# Patient Record
Sex: MALE | Race: WHITE | NOT HISPANIC OR LATINO | Employment: FULL TIME | URBAN - METROPOLITAN AREA
[De-identification: names, ages, dates, MRNs, and addresses within clinical notes are randomized per-mention and may not be internally consistent; named-entity substitution may affect disease eponyms.]

---

## 2021-04-27 ENCOUNTER — OFFICE VISIT (OUTPATIENT)
Dept: FAMILY MEDICINE CLINIC | Facility: CLINIC | Age: 41
End: 2021-04-27

## 2021-04-27 VITALS
HEART RATE: 78 BPM | WEIGHT: 256 LBS | HEIGHT: 72 IN | SYSTOLIC BLOOD PRESSURE: 112 MMHG | DIASTOLIC BLOOD PRESSURE: 78 MMHG | BODY MASS INDEX: 34.67 KG/M2 | TEMPERATURE: 98 F | OXYGEN SATURATION: 94 %

## 2021-04-27 DIAGNOSIS — R22.0 FACIAL MASS: ICD-10-CM

## 2021-04-27 DIAGNOSIS — G44.229 CHRONIC TENSION-TYPE HEADACHE, NOT INTRACTABLE: ICD-10-CM

## 2021-04-27 DIAGNOSIS — Z00.00 HEALTH MAINTENANCE EXAMINATION: ICD-10-CM

## 2021-04-27 DIAGNOSIS — G44.86 CERVICOGENIC HEADACHE: Primary | ICD-10-CM

## 2021-04-27 PROCEDURE — 99204 OFFICE O/P NEW MOD 45 MIN: CPT | Performed by: NURSE PRACTITIONER

## 2021-04-27 RX ORDER — NAPROXEN 500 MG/1
500 TABLET ORAL 2 TIMES DAILY WITH MEALS
Qty: 60 TABLET | Refills: 1 | Status: SHIPPED | OUTPATIENT
Start: 2021-04-27

## 2021-04-27 RX ORDER — IBUPROFEN 200 MG
200 TABLET ORAL EVERY 6 HOURS PRN
COMMUNITY

## 2021-04-27 NOTE — PROGRESS NOTES
"Chief Complaint  Establish Care (headache, cys on forehead) and Annual Exam    Subjective          Rony Mack presents to [facility]  Pleasant gentleman here today needs a new PCP is not been to the doctor for a while.  A couple longstanding issues he would like to discuss first is his forehead he has a small lump, likely cyst on his forehead he said the several years, is not really bothering him much but his parents keep asking him about it, thus he is here to discuss and he would like to go ahead and get it removed.  Is having no pain in this area, is not getting bigger, no redness or lesions.    He also has separate issue of headaches, tend to be in the evening at the end of his workday, more right-sided they tend to originate in his right occipital region around his C-spine but now actually having a C-spine tenderness or pain, his pain actually originates in his occipital region and extends upward to his right parietal, occasionally behind his right eye.  Its not throbbing not a vascular complaint no pulsating headache no fevers chills nausea vomiting paresthesias no associated vision change or gait difficulties.  He has no history of seizure or chronic migraines.  His headaches been going on for quite a few years really had not changed and characterization.  Not a big issue.  He is not had a previous work-up or MRI.    Family history  Possibly sister with migraines when she was young  No aneurysm history  No colon cancer or prostate cancer no early heart disease    Quit smoking 2 to 3 years ago                    Objective   Vital Signs:   /78   Pulse 78   Temp 98 °F (36.7 °C) (Temporal)   Ht 182.9 cm (72\")   Wt 116 kg (256 lb)   SpO2 94%   BMI 34.72 kg/m²     Physical Exam  Vitals reviewed.   Constitutional:       Appearance: Normal appearance. He is well-developed. He is not ill-appearing.   HENT:      Head: Normocephalic.      Comments: Right upper forehead at the " scalp line approximately 1.5 cm x .5   slightly elevated mobile mass consistent with a cyst less likely lipoma     Right Ear: Tympanic membrane normal.      Left Ear: Tympanic membrane normal.      Nose: Nose normal.      Mouth/Throat:      Mouth: Mucous membranes are dry.   Eyes:      General: No scleral icterus.     Conjunctiva/sclera: Conjunctivae normal.      Pupils: Pupils are equal, round, and reactive to light.   Neck:      Thyroid: No thyromegaly.      Vascular: No JVD.   Cardiovascular:      Rate and Rhythm: Normal rate and regular rhythm.      Heart sounds: Normal heart sounds. No murmur heard.   No friction rub. No gallop.    Pulmonary:      Effort: Pulmonary effort is normal. No respiratory distress.      Breath sounds: Normal breath sounds. No stridor. No wheezing or rales.   Abdominal:      General: Bowel sounds are normal. There is no distension.      Palpations: Abdomen is soft.      Tenderness: There is no abdominal tenderness.      Comments: No hepatosplenomegaly, no ascites,   Musculoskeletal:         General: No tenderness.      Cervical back: Neck supple.   Lymphadenopathy:      Cervical: No cervical adenopathy.   Skin:     General: Skin is warm and dry.      Findings: No erythema or rash.   Neurological:      General: No focal deficit present.      Mental Status: He is alert.      Cranial Nerves: No cranial nerve deficit.      Sensory: No sensory deficit.      Motor: No weakness.      Coordination: Coordination normal.      Gait: Gait normal.      Deep Tendon Reflexes: Reflexes are normal and symmetric. Reflexes normal.   Psychiatric:         Behavior: Behavior normal.         Thought Content: Thought content normal.         Judgment: Judgment normal.        Result Review :                 Assessment and Plan    There are no diagnoses linked to this encounter.    Follow Up   No follow-ups on file.  Patient was given instructions and counseling regarding his condition or for health maintenance  advice. Please see specific information pulled into the AVS if appropriate.     Patient has a facial mass upper forehead mass likely a cyst refer surgery to evaluate and for cyst removal.  Unrelated headaches, suspicious for tension  And possible cervicalgia induced headache  Spends quite a bit of time working at the [employer] I think much of his tension headaches are related to stress and ergonomics and chronic tension    We will have a plan for tension and neck related headaches  MRI of the brain and go ahead and get his MRI he is not sure if he will proceed with this at this time  He had no change in his headaches have been pretty consistent for couple years he is finally coming in to discuss these  Other option would be to see if things do not improve over the next 2 to 3 months of treatment and if not get the MRI certainly if he has any uncontrolled headaches new headache or worsening symptoms he should see neurology and MRI of the brain    Otherwise healthy diet regular exercise gradual weight reduction improve diet yearly physical    Office visit approximately 40 minutes  Detailed risk-benefit including bleed kidney failure heart attack stroke elevated blood pressure strategy to mitigate risk lowest effective dose shortest time possible

## 2021-04-27 NOTE — PATIENT INSTRUCTIONS
Discharge instructions    Daily stretching of the neck,  And massage of the muscles, posterior scalp occipital region and neck.    Abortive therapy early for any headache, with relaxation meditation massage, naproxen if needed use sparingly    Okay to give this up to 2 to 3 months for improvement if these are mild and improving  To wait on the MRI but certainly we should get MRI of your headaches are not improving    Any severe worsening headaches urgent recheck urgent MRI  Severe persisting worst headache ever weakness confusion slurred speech paresthesias vision difficulties gait difficulties emergency room.    Healthy diet regular exercise  1800 vahe a day mostly vegetables chicken fish maximum 2000 vahe  More vegetables less much less processed food fast food  Less breads and pasta which drive appetite and promote fatty liver.  64 ounces of water daily      Good ergonomics at your desk, consider changing the monitor height etc. get up hourly  Consider a standing desk,      Return to office yearly physical recheck in 3 months    Tension Headache, Adult  A tension headache is pain, pressure, or aching in your head. Tension headaches can last from 30 minutes to several days.  Follow these instructions at home:  Managing pain  · Take over-the-counter and prescription medicines only as told by your doctor.  · When you have a headache, lie down in a dark, quiet room.  · If told, put ice on your head and neck:  ? Put ice in a plastic bag.  ? Place a towel between your skin and the bag.  ? Leave the ice on for 20 minutes, 2-3 times a day.  · If told, put heat on the back of your neck. Do this as often as your doctor tells you to. Use the kind of heat that your doctor recommends, such as a moist heat pack or a heating pad.  ? Place a towel between your skin and the heat.  ? Leave the heat on for 20-30 minutes.  ? Remove the heat if your skin turns bright red.  Eating and drinking  · Eat meals on a regular  schedule.  · Watch how much alcohol you drink:  ? If you are a woman and are not pregnant, do not drink more than 1 drink a day.  ? If you are a man, do not drink more than 2 drinks a day.  · Drink enough fluid to keep your pee (urine) pale yellow.  · Do not use a lot of caffeine, or stop using caffeine.  Lifestyle  · Get enough sleep. Get 7-9 hours of sleep each night. Or get the amount of sleep that your doctor tells you to.  · At bedtime, remove all electronic devices from your room. Examples of electronic devices are computers, phones, and tablets.  · Find ways to lessen your stress. Some things that can lessen stress are:  ? Exercise.  ? Deep breathing.  ? Yoga.  ? Music.  ? Positive thoughts.  · Sit up straight. Do not tighten (tense) your muscles.  · Do not use any products that have nicotine or tobacco in them, such as cigarettes and e-cigarettes. If you need help quitting, ask your doctor.  General instructions    · Keep all follow-up visits as told by your doctor. This is important.  · Avoid things that can bring on headaches. Keep a journal to find out if certain things bring on headaches. For example, write down:  ? What you eat and drink.  ? How much sleep you get.  ? Any change to your diet or medicines.  Contact a doctor if:  · Your headache does not get better.  · Your headache comes back.  · You have a headache and sounds, light, or smells bother you.  · You feel sick to your stomach (nauseous) or you throw up (vomit).  · Your stomach hurts.  Get help right away if:  · You suddenly get a very bad headache along with any of these:  ? A stiff neck.  ? Feeling sick to your stomach.  ? Throwing up.  ? Feeling weak.  ? Trouble seeing.  ? Feeling short of breath.  ? A rash.  ? Feeling unusually sleepy.  ? Trouble speaking.  ? Pain in your eye or ear.  ? Trouble walking or balancing.  ? Feeling like you will pass out (faint).  ? Passing out.  Summary  · A tension headache is pain, pressure, or aching in  your head.  · Tension headaches can last from 30 minutes to several days.  · Lifestyle changes and medicines may help relieve pain.  This information is not intended to replace advice given to you by your health care provider. Make sure you discuss any questions you have with your health care provider.  Document Revised: 10/14/2020 Document Reviewed: 03/30/2018  Elsevier Patient Education © 2021 [employer]

## 2021-04-28 LAB
ALBUMIN SERPL-MCNC: 4.9 G/DL (ref 3.5–5.2)
ALBUMIN/GLOB SERPL: 2.3 G/DL
ALP SERPL-CCNC: 71 U/L (ref 39–117)
ALT SERPL-CCNC: 31 U/L (ref 1–41)
APPEARANCE UR: CLEAR
AST SERPL-CCNC: 23 U/L (ref 1–40)
BASOPHILS # BLD AUTO: 0.05 10*3/MM3 (ref 0–0.2)
BASOPHILS NFR BLD AUTO: 0.8 % (ref 0–1.5)
BILIRUB SERPL-MCNC: 1 MG/DL (ref 0–1.2)
BILIRUB UR QL STRIP: NEGATIVE
BUN SERPL-MCNC: 21 MG/DL (ref 6–20)
BUN/CREAT SERPL: 18.3 (ref 7–25)
CALCIUM SERPL-MCNC: 9.8 MG/DL (ref 8.6–10.5)
CHLORIDE SERPL-SCNC: 106 MMOL/L (ref 98–107)
CHOLEST SERPL-MCNC: 186 MG/DL (ref 0–200)
CO2 SERPL-SCNC: 25.7 MMOL/L (ref 22–29)
COLOR UR: YELLOW
CREAT SERPL-MCNC: 1.15 MG/DL (ref 0.76–1.27)
EOSINOPHIL # BLD AUTO: 0.21 10*3/MM3 (ref 0–0.4)
EOSINOPHIL NFR BLD AUTO: 3.6 % (ref 0.3–6.2)
ERYTHROCYTE [DISTWIDTH] IN BLOOD BY AUTOMATED COUNT: 12.7 % (ref 12.3–15.4)
ERYTHROCYTE [SEDIMENTATION RATE] IN BLOOD BY WESTERGREN METHOD: 2 MM/HR (ref 0–15)
GLOBULIN SER CALC-MCNC: 2.1 GM/DL
GLUCOSE SERPL-MCNC: 81 MG/DL (ref 65–99)
GLUCOSE UR QL: NEGATIVE
HCT VFR BLD AUTO: 47.9 % (ref 37.5–51)
HDLC SERPL-MCNC: 30 MG/DL (ref 40–60)
HGB BLD-MCNC: 16.8 G/DL (ref 13–17.7)
HGB UR QL STRIP: NEGATIVE
IMM GRANULOCYTES # BLD AUTO: 0.02 10*3/MM3 (ref 0–0.05)
IMM GRANULOCYTES NFR BLD AUTO: 0.3 % (ref 0–0.5)
KETONES UR QL STRIP: NEGATIVE
LDLC SERPL CALC-MCNC: 102 MG/DL (ref 0–100)
LDLC/HDLC SERPL: 3.09 {RATIO}
LEUKOCYTE ESTERASE UR QL STRIP: NEGATIVE
LYMPHOCYTES # BLD AUTO: 1.97 10*3/MM3 (ref 0.7–3.1)
LYMPHOCYTES NFR BLD AUTO: 33.4 % (ref 19.6–45.3)
MCH RBC QN AUTO: 30.8 PG (ref 26.6–33)
MCHC RBC AUTO-ENTMCNC: 35.1 G/DL (ref 31.5–35.7)
MCV RBC AUTO: 87.9 FL (ref 79–97)
MONOCYTES # BLD AUTO: 0.59 10*3/MM3 (ref 0.1–0.9)
MONOCYTES NFR BLD AUTO: 10 % (ref 5–12)
NEUTROPHILS # BLD AUTO: 3.06 10*3/MM3 (ref 1.7–7)
NEUTROPHILS NFR BLD AUTO: 51.9 % (ref 42.7–76)
NITRITE UR QL STRIP: NEGATIVE
NRBC BLD AUTO-RTO: 0 /100 WBC (ref 0–0.2)
PH UR STRIP: 5.5 [PH] (ref 5–8)
PLATELET # BLD AUTO: 230 10*3/MM3 (ref 140–450)
POTASSIUM SERPL-SCNC: 4.4 MMOL/L (ref 3.5–5.2)
PROT SERPL-MCNC: 7 G/DL (ref 6–8.5)
PROT UR QL STRIP: NEGATIVE
RBC # BLD AUTO: 5.45 10*6/MM3 (ref 4.14–5.8)
SODIUM SERPL-SCNC: 144 MMOL/L (ref 136–145)
SP GR UR: 1.02 (ref 1–1.03)
T4 FREE SERPL-MCNC: 1.06 NG/DL (ref 0.93–1.7)
TRIGL SERPL-MCNC: 316 MG/DL (ref 0–150)
TSH SERPL DL<=0.005 MIU/L-ACNC: 6.68 UIU/ML (ref 0.27–4.2)
UROBILINOGEN UR STRIP-MCNC: NORMAL MG/DL
VLDLC SERPL CALC-MCNC: 54 MG/DL (ref 5–40)
WBC # BLD AUTO: 5.9 10*3/MM3 (ref 3.4–10.8)

## 2021-06-09 ENCOUNTER — TELEMEDICINE (OUTPATIENT)
Dept: FAMILY MEDICINE CLINIC | Facility: CLINIC | Age: 41
End: 2021-06-09

## 2021-06-09 DIAGNOSIS — G44.86 CERVICOGENIC HEADACHE: ICD-10-CM

## 2021-06-09 DIAGNOSIS — E78.2 MIXED HYPERLIPIDEMIA: ICD-10-CM

## 2021-06-09 DIAGNOSIS — E03.8 SUBCLINICAL HYPOTHYROIDISM: Primary | ICD-10-CM

## 2021-06-09 PROCEDURE — 99213 OFFICE O/P EST LOW 20 MIN: CPT | Performed by: NURSE PRACTITIONER

## 2021-06-09 NOTE — PROGRESS NOTES
Subjective   Rony Mack is a 40 y.o. male.     Pleasant patient here today follow-up recent abnormal lab specifically triglycerides very elevated along with LDL  TSH is high with a normal free T4 regarding thyroid  Headaches are getting better, doing stretching not consistently but trying to stick with it  No worsening headaches no vision changes no other complaints  No fatigue presently  TSH was high normal free T4 subclinical hypothyroid  I think it was nonfasting his triglycerides were quite high elevated LDL and low HDL we will repeat this soon he will outpatient lab       There were no vitals taken for this visit.      The following portions of the patient's history were reviewed and updated as appropriate: allergies, current medications, past family history, past medical history, past social history, past surgical history and problem list.    Review of Systems   Constitutional: Negative for fatigue and fever.   HENT: Negative.  Negative for trouble swallowing.    Eyes: Negative.    Respiratory: Negative.  Negative for cough and shortness of breath.    Cardiovascular: Negative for chest pain, palpitations and leg swelling.   Gastrointestinal: Negative.  Negative for abdominal pain.   Genitourinary: Negative.    Musculoskeletal: Negative.    Skin: Negative.    Neurological: Negative.  Negative for dizziness and confusion.   Psychiatric/Behavioral: Negative.         Objective   Physical Exam  Vitals reviewed.   Constitutional:       Appearance: He is well-developed.   HENT:      Head: Normocephalic.      Nose: Nose normal.   Eyes:      General: No scleral icterus.     Conjunctiva/sclera: Conjunctivae normal.      Pupils: Pupils are equal, round, and reactive to light.   Neck:      Thyroid: No thyromegaly.      Vascular: No JVD.   Cardiovascular:      Rate and Rhythm: Normal rate and regular rhythm.      Heart sounds: Normal heart sounds. No murmur heard.   No friction rub. No gallop.    Pulmonary:      Effort:  Pulmonary effort is normal. No respiratory distress.      Breath sounds: Normal breath sounds. No stridor. No wheezing or rales.   Abdominal:      General: Bowel sounds are normal. There is no distension.      Palpations: Abdomen is soft.      Tenderness: There is no abdominal tenderness.      Comments: No hepatosplenomegaly, no ascites,   Musculoskeletal:         General: No tenderness.      Cervical back: Neck supple.   Lymphadenopathy:      Cervical: No cervical adenopathy.   Skin:     General: Skin is warm and dry.      Findings: No erythema or rash.   Neurological:      Mental Status: He is alert and oriented to person, place, and time.      Deep Tendon Reflexes: Reflexes are normal and symmetric.   Psychiatric:         Behavior: Behavior normal.         Thought Content: Thought content normal.         Judgment: Judgment normal.           Assessment/Plan   Diagnoses and all orders for this visit:    1. Subclinical hypothyroidism (Primary)  -     Lipid Panel With LDL / HDL Ratio; Future  -     Thyroid Peroxidase Antibody; Future  -     Thyroid Antibodies; Future  -     TSH; Future  -     T4, Free; Future    2. Cervicogenic headache  -     Lipid Panel With LDL / HDL Ratio; Future  -     Thyroid Peroxidase Antibody; Future  -     Thyroid Antibodies; Future  -     TSH; Future  -     T4, Free; Future    3. Mixed hyperlipidemia  -     Lipid Panel With LDL / HDL Ratio; Future  -     Thyroid Peroxidase Antibody; Future  -     Thyroid Antibodies; Future  -     TSH; Future  -     T4, Free; Future    Continue present plan   Naproxen 500 every 12 hours as needed for headache   patient will keep his appointment next month if his headaches resolve he can notify me and let me know if he is not improving with MRI of his head and neck  Video visit unable to connect due to technical difficulties with patient permission this was switched to a telephone visit 15-minute        There are no Patient Instructions on file for this  visit.           Answers for HPI/ROS submitted by the patient on 6/9/2021  What is the primary reason for your visit?: Other  Please describe your symptoms.: Just want to talk about blood test results from last visit  Have you had these symptoms before?: No  How long have you been having these symptoms?: 1-4 days

## 2021-08-23 ENCOUNTER — TELEPHONE (OUTPATIENT)
Dept: FAMILY MEDICINE CLINIC | Facility: CLINIC | Age: 41
End: 2021-08-23

## 2021-12-14 ENCOUNTER — TELEPHONE (OUTPATIENT)
Dept: FAMILY MEDICINE CLINIC | Facility: CLINIC | Age: 41
End: 2021-12-14

## 2021-12-14 NOTE — TELEPHONE ENCOUNTER
Caller: Rony Mack    Relationship: Self    Best call back number: [phone]       What is the medical concern/diagnosis:       What specialty or service is being requested:     WANTING A REFERRAL TO AN UROLOGIST AND ALSO A   MRI FOR HEADACHES      What is the provider, practice or medical service name:  [facility]      Any additional details:     THESE ARE MEDICAL CONDITIONS/ISSUES THAT WAS DISCUSSED ON THE LAST [facility] VISIT.    PATIENT IS WANTING A CALL BACK WITH THIS INFORMATION PLEASE.

## 2021-12-15 DIAGNOSIS — N39.9 URINARY DISORDER: Primary | ICD-10-CM

## 2021-12-15 DIAGNOSIS — G89.29 CHRONIC NONINTRACTABLE HEADACHE, UNSPECIFIED HEADACHE TYPE: Primary | ICD-10-CM

## 2021-12-15 DIAGNOSIS — R51.9 FREQUENT HEADACHES: ICD-10-CM

## 2021-12-15 DIAGNOSIS — R51.9 CHRONIC NONINTRACTABLE HEADACHE, UNSPECIFIED HEADACHE TYPE: Primary | ICD-10-CM

## 2021-12-15 NOTE — TELEPHONE ENCOUNTER
Pt would not like to come in for another appointment pertaining to the same issue he complained to you about a couple months ago. He just wants you to go ahead and order the MRI without having to come in and be seen. You placed a MRI in the system he states previously and then told him to hold off on getting it. Please advise your thoughts.

## 2021-12-15 NOTE — TELEPHONE ENCOUNTER
I referred him to urology  And neurology for headaches  It may take a while to get into neurology  So I would recommend he come in now for an evaluation within the next couple weeks  And see if need to go ahead and order an MRI of his head  But he needs to follow-up for documentation reevaluation first    Any severe persistent headache emergency room  For any fever and urinary complaints ER    Thanks

## 2022-01-18 ENCOUNTER — HOSPITAL ENCOUNTER (OUTPATIENT)
Dept: MRI IMAGING | Facility: HOSPITAL | Age: 42
Discharge: HOME | End: 2022-01-18
Admitting: NURSE PRACTITIONER

## 2022-01-18 PROCEDURE — 70551 MRI BRAIN STEM W/O DYE: CPT

## 2022-12-23 ENCOUNTER — OFFICE VISIT (OUTPATIENT)
Dept: FAMILY MEDICINE CLINIC | Facility: CLINIC | Age: 42
End: 2022-12-23
Payer: COMMERCIAL

## 2022-12-23 VITALS
HEIGHT: 72 IN | BODY MASS INDEX: 33.46 KG/M2 | TEMPERATURE: 97.3 F | WEIGHT: 247 LBS | RESPIRATION RATE: 17 BRPM | HEART RATE: 79 BPM | OXYGEN SATURATION: 97 % | SYSTOLIC BLOOD PRESSURE: 120 MMHG | DIASTOLIC BLOOD PRESSURE: 68 MMHG

## 2022-12-23 DIAGNOSIS — J01.00 ACUTE NON-RECURRENT MAXILLARY SINUSITIS: Primary | ICD-10-CM

## 2022-12-23 PROCEDURE — 99213 OFFICE O/P EST LOW 20 MIN: CPT | Performed by: FAMILY MEDICINE

## 2022-12-23 RX ORDER — CIPROFLOXACIN 500 MG/1
500 TABLET, FILM COATED ORAL 2 TIMES DAILY
Qty: 20 TABLET | Refills: 0 | Status: SHIPPED | OUTPATIENT
Start: 2022-12-23

## 2022-12-23 RX ORDER — PREDNISONE 20 MG/1
40 TABLET ORAL DAILY
Qty: 10 TABLET | Refills: 0 | Status: SHIPPED | OUTPATIENT
Start: 2022-12-23 | End: 2022-12-28

## 2022-12-23 NOTE — PROGRESS NOTES
"Chief Complaint  Chief Complaint   Patient presents with   • Ear Fullness     Pt c/o ear fullness and sinus pressure  x 3 months      Bilateral ear fullness.     Subjective    History of Present Illness        Rony Mack presents to [facility] for   History of Present Illness  Bilateral ear fullness  Mr. Mack states the for the last 2 months his ears have been clogged. He notes that he was waiting for his ears to get better but they have not. The patient reports testing positive for COVID-19 a couple of weeks ago, and states that he got really sick from that. He states that while having COVID-19 his cheek bones started to hurt and thinks that it could be a sinus issue. The patient denies taking over-the-counter medication, but notes that he has used eardrops from [facility] used for swimmer ear at the start of the symptoms. The patient states that he used nasal spray to help with his congestion while traveling. The patient states that he does have sinus pressure.       Objective   Vital Signs:   Visit Vitals  /68   Pulse 79   Temp 97.3 °F (36.3 °C)   Resp 17   Ht 182.9 cm (72.01\")   Wt 112 kg (247 lb)   SpO2 97%   BMI 33.49 kg/m²       BMI is >= 30 and <35. (Class 1 Obesity). The following options were offered after discussion;: weight loss educational material (shared in after visit summary)     Physical Exam  Vitals reviewed.   Constitutional:       Appearance: He is well-developed.   HENT:      Head: Normocephalic.      Right Ear: External ear normal.      Left Ear: External ear normal.      Nose: Nose normal.   Eyes:      Conjunctiva/sclera: Conjunctivae normal.   Cardiovascular:      Rate and Rhythm: Normal rate and regular rhythm.   Pulmonary:      Effort: Pulmonary effort is normal.      Breath sounds: Normal breath sounds.   Musculoskeletal:         General: Normal range of motion.      Cervical back: Normal range of motion and neck supple.   Skin:     General: Skin " is warm and dry.      Capillary Refill: Capillary refill takes less than 2 seconds.   Neurological:      Mental Status: He is alert and oriented to person, place, and time.            Result Review :                      Assessment and Plan      Diagnoses and all orders for this visit:    1. Acute non-recurrent maxillary sinusitis (Primary)  Assessment & Plan:  - Patient prescribed prednisone 20mg tablet. Take 2 tablets by mouth daily for 5 days.  - Patient prescribed ciprofloxacin 500mg tablet. Take 1 tablet by mouth 2 times a day for 10 days.    Orders:  -     predniSONE (DELTASONE) 20 MG tablet; Take 2 tablets by mouth Daily for 5 days.  Dispense: 10 tablet; Refill: 0  -     ciprofloxacin (CIPRO) 500 MG tablet; Take 1 tablet by mouth 2 (Two) Times a Day.  Dispense: 20 tablet; Refill: 0         Follow Up   No follow-ups on file.  Patient was given instructions and counseling regarding his condition or for health maintenance advice. Please see specific information pulled into the AVS if appropriate.     Transcribed from ambient dictation for Naresh Toledo Sr, MD by Claudia Desir.  12/23/22   19:38 EST    Patient or patient representative verbalized consent to the visit recording.  I have personally performed the services described in this document as transcribed by the above individual, and it is both accurate and complete.

## 2022-12-24 NOTE — ASSESSMENT & PLAN NOTE
- Patient prescribed prednisone 20mg tablet. Take 2 tablets by mouth daily for 5 days.  - Patient prescribed ciprofloxacin 500mg tablet. Take 1 tablet by mouth 2 times a day for 10 days.